# Patient Record
(demographics unavailable — no encounter records)

---

## 2025-04-17 NOTE — HISTORY OF PRESENT ILLNESS
[Parents] : parents [whole] : whole milk [Fruit] : fruit [Meat] : meat [Grains] : grains [Eggs] : eggs [Dairy] : dairy [Eats meals with family] : eats meals with family [Normal] : Normal [Brushing teeth twice/d] : brushing teeth twice per day [Yes] : Patient goes to dentist yearly [Toothpaste] : Primary Fluoride Source: Toothpaste [Playtime (60 min/d)] : playtime 60 min a day [< 2 hrs of screen time per day] : less than 2 hrs of screen time per day [Special Education] : special education  [No] : No cigarette smoke exposure [Exposure to alcohol] : exposure to alcohol [Appropriately restrained in motor vehicle] : appropriately restrained in motor vehicle [Supervised outdoor play] : supervised outdoor play [Supervised around water] : supervised around water [Wears helmet and pads] : wears helmet and pads [Parent knows child's friends] : parent knows child's friends [Parent discusses safety rules regarding adults] : parent discusses safety rules regarding adults [Family discusses home emergency plan] : family discusses home emergency plan [Monitored computer use] : monitored computer use [Up to date] : Up to date [NO] : No [Exposure to tobacco] : no exposure to tobacco [Exposure to electronic nicotine delivery system] : No exposure to electronic nicotine delivery system [Exposure to illicit drugs] : no exposure to illicit drugs [FreeTextEntry7] : N/c [de-identified] : none [de-identified] : Autism [FreeTextEntry1] : Efrem is a healthy 10-year-old Autistic child here for well care

## 2025-04-17 NOTE — DISCUSSION/SUMMARY
[Normal Growth] : growth [No Elimination Concerns] : elimination [Continue Regimen] : feeding [No Skin Concerns] : skin [Normal Sleep Pattern] : sleep [None] : no medical problems [Anticipatory Guidance Given] : Anticipatory guidance addressed as per the history of present illness section [No Vaccines] : no vaccines needed [No Medication Changes] : no medication changes [Parent/Guardian] : Parent/Guardian [Full Activity without restrictions including Physical Education & Athletics] : Full Activity without restrictions including Physical Education & Athletics [I have examined the above-named student and completed the preparticipation physical evaluation. The athlete does not present apparent clinical contraindications to practice and participate in sport(s) as outlined above. A copy of the physical exam is on r] : I have examined the above-named student and completed the preparticipation physical evaluation. The athlete does not present apparent clinical contraindications to practice and participate in sport(s) as outlined above. A copy of the physical exam is on record in my office and can be made available to the school at the request of the parents. If conditions arise after the athlete has been cleared for participation, the physician may rescind the clearance until the problem is resolved and the potential consequences are completely explained to the athlete (and parents/guardians). [de-identified] : Autism [FreeTextEntry1] : Vx up to date, physical exam incomplete due to no cooperation, Growth wnl, Autistic, Vision wnl, f/u 1 year